# Patient Record
(demographics unavailable — no encounter records)

---

## 2019-09-03 NOTE — EMERGENCY DEPARTMENT REPORT
HPI





- General


Chief Complaint: Urogenital-Male


Time Seen by Provider: 09/03/19 13:32





- HPI


HPI: 


33-year-old  male presents to the emergency department with the 

complaint of needing an STD check.  He says that he was sent a text message from

his girlfriend saying that she was positive for chlamydia.  The patient denies 

any penile discharge, dysuria, pain or swelling to the penis or testicles, any 

rash or lesions.  The patient also says that he has a history of chlamydia in 

the past and he does not have any of those symptoms.








ED Past Medical Hx





- Past Medical History


Previous Medical History?: No





- Surgical History


Past Surgical History?: No





- Social History


Smoking Status: Never Smoker


Substance Use Type: Marijuana





ED Review of Systems


ROS: 


Stated complaint: STD CHECK


Other details as noted in HPI





Comment: All other systems reviewed and negative


Genitourinary: denies: dysuria, frequency, discharge, testicular pain





Physical Exam





- Physical Exam


Vital Signs: 


                                   Vital Signs











  09/03/19





  12:20


 


Temperature 98.6 F


 


Pulse Rate 88


 


Respiratory 18





Rate 


 


Blood Pressure 121/77


 


O2 Sat by Pulse 99





Oximetry 














ED Course


                                   Vital Signs











  09/03/19





  12:20


 


Temperature 98.6 F


 


Pulse Rate 88


 


Respiratory 18





Rate 


 


Blood Pressure 121/77


 


O2 Sat by Pulse 99





Oximetry 











Critical care attestation.: 


If time is entered above; I have spent that time in minutes in the direct care 

of this critically ill patient, excluding procedure time.








ED Disposition


Clinical Impression: 


 Potential exposure to STD





Disposition: DC-01 TO HOME OR SELFCARE


Is pt being admited?: No


Condition: Stable


Instructions:  Sexually Transmitted Diseases (ED), Safe Sex (ED)


Additional Instructions: 


I have given you a outpatient referral for the local [facility] 

and [facility].  You can make an appointment to be seen at either 

of these facilities for STD checks/testing and treatment if necessary.  Return 

to the emergency department with any symptoms, concerns, or with any acute 

distress.





You should avoid any sexual intercourse or contact until it has been at least 7 

days after your partner was treated for the culture positive chlamydia.  

However, if you do have any undiagnosed or underlying STDs, you can still pass 

this along to your partner or any sexual partner.  Practice safe sex.


Referrals: 


[facility] [Outside] - 2-3 Days


[facility] [Outside] - 2-3 Days


Time of Disposition: 14:05